# Patient Record
Sex: FEMALE | Race: WHITE
[De-identification: names, ages, dates, MRNs, and addresses within clinical notes are randomized per-mention and may not be internally consistent; named-entity substitution may affect disease eponyms.]

---
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
## 2024-03-11 NOTE — HISTORY AND PHYSICAL: SUR[facility]
History & Physical


Date


Mar 30, 2018.





Chief Complaint


CHRONIC TONSILLITIS





History of Present Illness


The patient is a 12 year old female with complaints of CHRONIC TONSILLITIS








Past Medical/Surgical History


PMH: SOMNAMBULISM


PSH: NONE





Additional History


Hepatic Disease:  No


Endocrine Disorder:  No


Kidney Disease:  No


Hypertension:  No


Heart Disease:  No


Bleeding Tendencies:  No


Infectious Diseases:  No





Allergies


Coded Allergies:  


     NO KNOWN DRUG ALLERGIES (Verified  Allergy, Unknown, ., 3/30/18)





Home Medications


No Active Prescriptions or Reported Meds





Physical Examination


Skin:  warm/dry, no rash


Eyes:  normal inspection, EOMI, sclerae normal


ENT:  + pertinent finding (2+ TONSILS WITH TONSILLITHS)


Head:  normocephalic, atraumatic


Neck:  supple, no adenopathy, trachea midline


Respiratory/Chest:  lungs clear, normal breath sounds, no respiratory distress


Cardiovascular:  regular rate, rhythm, no edema, no murmur


Neurologic/Psych:  no motor/sensory deficits, alert, normal reflexes, oriented 

x 3





Diagnosis


CHRONIC TONSILLITIS





Plan of Treatment


T&A Hide Include Location In Plan Question?: No Detail Level: Detailed